# Patient Record
Sex: FEMALE | Race: WHITE | URBAN - METROPOLITAN AREA
[De-identification: names, ages, dates, MRNs, and addresses within clinical notes are randomized per-mention and may not be internally consistent; named-entity substitution may affect disease eponyms.]

---

## 2019-07-25 ENCOUNTER — OFFICE VISIT (OUTPATIENT)
Dept: URBAN - METROPOLITAN AREA CLINIC 71 | Facility: CLINIC | Age: 59
End: 2019-07-25
Payer: OTHER GOVERNMENT

## 2019-07-25 PROCEDURE — 92002 INTRM OPH EXAM NEW PATIENT: CPT | Performed by: OPTOMETRIST

## 2019-07-25 ASSESSMENT — KERATOMETRY
OS: 42.75
OD: 42.88

## 2019-07-25 ASSESSMENT — INTRAOCULAR PRESSURE
OS: 15
OD: 14

## 2019-07-25 ASSESSMENT — VISUAL ACUITY
OD: 20/20-
OS: 20/25+

## 2019-07-25 NOTE — IMPRESSION/PLAN
Impression: Hypermetropia, bilateral: H52.03. Plan: Pt inquired about contact lenses. Discussed options. Pt declined contact lenses today. A glasses prescription has been discussed and generated. Patient to call with any concerns.

## 2019-08-06 NOTE — IMPRESSION/PLAN
Impression: Dry eye syndrome of bilateral lacrimal glands: H04.123. eyes water. does a lot of work on the computer. Plan: observe. Call if worsens.

## 2021-01-14 ENCOUNTER — OFFICE VISIT (OUTPATIENT)
Dept: URBAN - METROPOLITAN AREA CLINIC 71 | Facility: CLINIC | Age: 61
End: 2021-01-14
Payer: OTHER GOVERNMENT

## 2021-01-14 PROCEDURE — 92014 COMPRE OPH EXAM EST PT 1/>: CPT | Performed by: OPTOMETRIST

## 2021-01-14 ASSESSMENT — INTRAOCULAR PRESSURE
OD: 15
OS: 16

## 2021-01-14 ASSESSMENT — VISUAL ACUITY
OS: 20/20
OD: 20/20

## 2021-01-14 NOTE — IMPRESSION/PLAN
Impression: Dry eye syndrome of bilateral lacrimal glands: H04.123. eyes water. does a lot of work on the computer. Plan: Discussed good blinking habits. Continue to observe. Call if worsens.

## 2022-01-13 ENCOUNTER — OFFICE VISIT (OUTPATIENT)
Dept: URBAN - METROPOLITAN AREA CLINIC 71 | Facility: CLINIC | Age: 62
End: 2022-01-13
Payer: OTHER GOVERNMENT

## 2022-01-13 DIAGNOSIS — H52.03 HYPERMETROPIA, BILATERAL: ICD-10-CM

## 2022-01-13 DIAGNOSIS — H04.123 DRY EYE SYNDROME OF BILATERAL LACRIMAL GLANDS: Primary | ICD-10-CM

## 2022-01-13 PROCEDURE — 92014 COMPRE OPH EXAM EST PT 1/>: CPT | Performed by: OPTOMETRIST

## 2022-01-13 ASSESSMENT — INTRAOCULAR PRESSURE
OD: 14
OS: 15

## 2022-01-13 ASSESSMENT — VISUAL ACUITY
OD: 20/20
OS: 20/20

## 2022-01-13 ASSESSMENT — KERATOMETRY
OS: 42.63
OD: 42.88

## 2022-01-13 NOTE — IMPRESSION/PLAN
Impression: Dry eye syndrome of bilateral lacrimal glands: H04.123. PT does a lot of work on the computer. Uses Visine a few times per week. Plan: Use artificial tears up to QID. Call if worsens or no improvement.

## 2023-01-16 NOTE — IMPRESSION/PLAN
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 Floppy eyelids, mild incomplete blink OU. Symptoms worse in the AM. Uses [employer] ATs very occasionally. Plan: Educated pt on findings. Use artificial tears up to QID. Recommend pt use thick lubricant before bed OU. Emphasized importance of good blinking habits, especially with extended near/computer work. Discussed pt consider using sleeping goggles at night. Call if worsens or no improvement.